# Patient Record
Sex: MALE | Race: ASIAN | NOT HISPANIC OR LATINO
[De-identification: names, ages, dates, MRNs, and addresses within clinical notes are randomized per-mention and may not be internally consistent; named-entity substitution may affect disease eponyms.]

---
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
## 2021-06-29 ENCOUNTER — APPOINTMENT (OUTPATIENT)
Dept: PEDIATRICS | Facility: CLINIC | Age: 21
End: 2021-06-29
Payer: MEDICAID

## 2021-06-29 VITALS
HEART RATE: 70 BPM | SYSTOLIC BLOOD PRESSURE: 120 MMHG | HEIGHT: 71 IN | BODY MASS INDEX: 21.67 KG/M2 | WEIGHT: 154.8 LBS | DIASTOLIC BLOOD PRESSURE: 72 MMHG

## 2021-06-29 DIAGNOSIS — Z78.9 OTHER SPECIFIED HEALTH STATUS: ICD-10-CM

## 2021-06-29 DIAGNOSIS — L21.0 SEBORRHEA CAPITIS: ICD-10-CM

## 2021-06-29 DIAGNOSIS — L85.3 XEROSIS CUTIS: ICD-10-CM

## 2021-06-29 PROCEDURE — 99173 VISUAL ACUITY SCREEN: CPT | Mod: 59

## 2021-06-29 PROCEDURE — 92551 PURE TONE HEARING TEST AIR: CPT

## 2021-06-29 PROCEDURE — 96160 PT-FOCUSED HLTH RISK ASSMT: CPT | Mod: 59

## 2021-06-29 PROCEDURE — 99395 PREV VISIT EST AGE 18-39: CPT | Mod: 25

## 2021-06-29 RX ORDER — UBIDECARENONE/VIT E ACET 100MG-5
1000 CAPSULE ORAL
Refills: 0 | Status: COMPLETED | COMMUNITY
End: 2021-06-29

## 2021-06-29 NOTE — HISTORY OF PRESENT ILLNESS
[Up to date] : Up to date [Eats meals with family] : eats meals with family [Has family members/adults to turn to for help] : has family members/adults to turn to for help [Is permitted and is able to make independent decisions] : Is permitted and is able to make independent decisions [Sleep Concerns] : no sleep concerns [Eats regular meals including adequate fruits and vegetables] : does not eat regular meals including adequate fruits and vegetables [Drinks non-sweetened liquids] : drinks non-sweetened liquids  [Calcium source] : calcium source [Has friends] : has friends [At least 1 hour of physical activity a day] : at least 1 hour of physical activity a day [Screen time (except homework) less than 2 hours a day] : screen time (except homework) less than 2 hours a day [Uses electronic nicotine delivery system] : does not use electronic nicotine delivery system [Uses tobacco] : does not use tobacco [Uses drugs] : does not use drugs  [Drinks alcohol] : does not drink alcohol [No] : Patient has not had sexual intercourse [Has ways to cope with stress] : has ways to cope with stress [Displays self-confidence] : displays self-confidence [Has problems with sleep] : does not have problems with sleep [Gets depressed, anxious, or irritable/has mood swings] : does not get depressed, anxious, or irritable/has mood swings [Has thought about hurting self or considered suicide] : has not thought about hurting self or considered suicide [With Teen] : teen [de-identified] : self [FreeTextEntry7] : 21 year well check.  Patient doing well.  No  concerns.  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  No current issues noted.\par - CRAFFT form reviewed.\par

## 2021-06-29 NOTE — DISCUSSION/SUMMARY
[Met privately with the adolescent for part of the office visit?] : Met privately with the adolescent for part of the office visit? Yes [Adolescent asks questions during each office  visit and participates in the care plan?] : Adolescent asks questions during each office visit and participates in the care plan? Yes [Initiated discussion about transfer to an adult healthcare provider.  Provided copy of transition letter?] : Initiated discussion about transfer to an adult healthcare provider.  Provided copy of transition letter? Yes [FreeTextEntry1] : - Follow up in 1 year for annual physical or sooner PRN.\par

## 2021-07-27 ENCOUNTER — APPOINTMENT (OUTPATIENT)
Dept: OTOLARYNGOLOGY | Facility: CLINIC | Age: 21
End: 2021-07-27
Payer: MEDICAID

## 2021-07-27 ENCOUNTER — NON-APPOINTMENT (OUTPATIENT)
Age: 21
End: 2021-07-27

## 2021-07-27 VITALS
HEIGHT: 71 IN | DIASTOLIC BLOOD PRESSURE: 65 MMHG | TEMPERATURE: 97.88 F | WEIGHT: 156 LBS | SYSTOLIC BLOOD PRESSURE: 103 MMHG | BODY MASS INDEX: 21.84 KG/M2 | HEART RATE: 54 BPM

## 2021-07-27 PROCEDURE — 31231 NASAL ENDOSCOPY DX: CPT

## 2021-07-27 PROCEDURE — 99204 OFFICE O/P NEW MOD 45 MIN: CPT | Mod: 25

## 2021-07-27 NOTE — CONSULT LETTER
[Please see my note below.] : Please see my note below. [FreeTextEntry1] : Dear Dr. LUPE BECK \par I had the pleasure of evaluating your patient ANGELINA MADSEN, thank you for allowing us to participate in their care. please see full note detailing our visit below.\par If you have any questions, please do not hesitate to call me and I would be happy to discuss further. \par \par Gray Gonzalez M.D.\par Attending Physician,  \par Department of Otolaryngology - Head and Neck Surgery\par [facility] \par Office: [phone]\par Fax: [phone]\par \par

## 2021-07-27 NOTE — END OF VISIT
[FreeTextEntry3] : I personally saw and examined ANGELINA MADSEN in detail. I spoke to CLAUS De regarding the assessment and plan of care.  I preformed the procedures and I reviewed the above assessment and plan of care, and agree. I have made changes in changes in the body of the note where appropriate.\par \par

## 2021-07-27 NOTE — HISTORY OF PRESENT ILLNESS
[de-identified] : 22 y/o M with hx of asthma when he was younger now c/o constant allergies, and constant b/l nasal congestion x whole life, states its all year around. \par Pt states mostly all day, but worse in the morning when he wakes up. \par pt not taking any nasal sprays, did try Claritin in the past with no relief \par tested for allergies when he was younger, long time ago. \par pt also with constant maxillary sinus pressure x 2 years  b/l, states worse in the morning and continues through the day. \par doesn’t really sinus infections, but has chronic sinus pressure everyday. \par never received abx or steroids.

## 2021-07-27 NOTE — PHYSICAL EXAM
[Midline] : trachea located in midline position [Normal] : no rashes [de-identified] : +nevin, globally to the left, and internally to the left, dorsally over projected, large tip, polybeak deformity

## 2021-07-27 NOTE — ASSESSMENT
[FreeTextEntry1] : pt with chronic nasal congestion, and sinus pressure with severe left NSD and BITH likely with allergies causing significant distress affecting sleeping and daytime breathing. has tried multiple Tx in the past has not helped, and is unlikely to help left side as it is total blocked anatomically.\par also on exam poor tip support, +nevin; nasal valve collapse, complete caudal severe left sided septum obstruction\par - ordered CT scan to access sinuses \par - We will proceed with a regiment of decongestants, antibiotics and irrigation to try to break the cycle of inflation and obstruction. this will treat the acute symptoms and will hopefully allow for maintenance therapy to reach disease areas and avoid further intervention.\par - Risks benefits and alternatives to septonasal reconstruction and bilateral inferior turbinate reduction discussed. risks of bleeding, infection, septal hematoma, injury to the skull base, septal perforation results in whistling, crusting and bleeding as well as continued nasal obstruction, cosmetic deformity and collapse were discussed. Patient understood risks and would like to continue with the operation.\par Offered option of cosmetic reconstruction. patient  did not want to pay extra for the cosmetic piece and his main concern is his breathing\par - pics taken today \par - allergy test next visit \par - simulation next visit \par can plan to do b/l  R>L as has a left global nasal deviation, with tip work with and dorsal reduction with osteotomies \par \par \par

## 2021-07-27 NOTE — PROCEDURE
[FreeTextEntry6] : Procedure performed: Nasal Endoscopy- Diagnostic\par Pre-op indication(s): nasal congestion\par Post-op indication(s): nasal congestion \par Verbal and/or written consent obtained from patient\par Anterior rhinoscopy insufficient to account for symptoms\par Scope #: 3,  flexible fiber optic telescope \par The scope was introduced in the nasal passage between the middle and inferior turbinates to exam the inferior portion of the middle meatus and the fontanelle, as well as the maxillary ostia.  Next, the scope was passed medically and posteriorly to the middle turbinates to examine the sphenoethmoid recess and the superior turbinate region.\par Upon visualization the finders are as follows:\par Nasal Septum: sigmoidal septal deviation, with severe nasal valve collapse \par Bilateral - Mucosa: boggy turbinates, Mucous: scant, Polyp: not seen, Inferior Turbinate: boggy, Middle Turbinate: b/l BITH, Superior Turbinate: normal, Inferior Meatus: narrow, Middle Meatus: narrow, Super Meatus:normal, Sphenoethmoidal Recess: clear\par

## 2021-07-30 ENCOUNTER — APPOINTMENT (OUTPATIENT)
Dept: OTOLARYNGOLOGY | Facility: CLINIC | Age: 21
End: 2021-07-30

## 2021-08-05 ENCOUNTER — OUTPATIENT (OUTPATIENT)
Dept: OUTPATIENT SERVICES | Facility: HOSPITAL | Age: 21
LOS: 1 days | End: 2021-08-05
Payer: MEDICAID

## 2021-08-05 ENCOUNTER — APPOINTMENT (OUTPATIENT)
Dept: CT IMAGING | Facility: CLINIC | Age: 21
End: 2021-08-05
Payer: MEDICAID

## 2021-08-05 DIAGNOSIS — J34.89 OTHER SPECIFIED DISORDERS OF NOSE AND NASAL SINUSES: ICD-10-CM

## 2021-08-05 PROCEDURE — 70486 CT MAXILLOFACIAL W/O DYE: CPT | Mod: 26

## 2021-08-05 PROCEDURE — 70486 CT MAXILLOFACIAL W/O DYE: CPT

## 2021-08-10 ENCOUNTER — NON-APPOINTMENT (OUTPATIENT)
Age: 21
End: 2021-08-10

## 2021-08-11 ENCOUNTER — NON-APPOINTMENT (OUTPATIENT)
Age: 21
End: 2021-08-11

## 2021-08-19 ENCOUNTER — APPOINTMENT (OUTPATIENT)
Dept: PEDIATRICS | Facility: CLINIC | Age: 21
End: 2021-08-19
Payer: MEDICAID

## 2021-08-19 VITALS — TEMPERATURE: 208.4 F

## 2021-08-19 PROCEDURE — 86580 TB INTRADERMAL TEST: CPT

## 2021-08-30 ENCOUNTER — APPOINTMENT (OUTPATIENT)
Dept: OTOLARYNGOLOGY | Facility: CLINIC | Age: 21
End: 2021-08-30
Payer: MEDICAID

## 2021-08-30 VITALS
HEIGHT: 71 IN | DIASTOLIC BLOOD PRESSURE: 59 MMHG | TEMPERATURE: 98 F | WEIGHT: 156 LBS | HEART RATE: 61 BPM | SYSTOLIC BLOOD PRESSURE: 99 MMHG | BODY MASS INDEX: 21.84 KG/M2

## 2021-08-30 PROCEDURE — 99214 OFFICE O/P EST MOD 30 MIN: CPT | Mod: 25

## 2021-08-30 PROCEDURE — 95004 PERQ TESTS W/ALRGNC XTRCS: CPT

## 2021-08-30 PROCEDURE — 31231 NASAL ENDOSCOPY DX: CPT

## 2021-08-30 NOTE — REASON FOR VISIT
[Subsequent Evaluation] : a subsequent evaluation for [FreeTextEntry2] : allergies and nasal congestion

## 2021-08-30 NOTE — CONSULT LETTER
[Please see my note below.] : Please see my note below. [FreeTextEntry1] : Dear Dr. LUPE BECK \par I had the pleasure of evaluating your patient ANGELINA MADSEN, thank you for allowing us to participate in their care. please see full note detailing our visit below.\par If you have any questions, please do not hesitate to call me and I would be happy to discuss further. \par \par Gray Gonzalez M.D.\par Attending Physician,  \par Department of Otolaryngology - Head and Neck Surgery\par [facility] \par Office: [phone]\par Fax: [phone]\par \par

## 2021-08-30 NOTE — PHYSICAL EXAM
[Midline] : trachea located in midline position [Normal] : no rashes [de-identified] : +nevin, globally to the left, and internally to the left, dorsally over projected, large tip, polybeak deformity

## 2021-08-30 NOTE — HISTORY OF PRESENT ILLNESS
[de-identified] : 22 y/o M with hx of asthma when he was younger now c/o constant allergies, and constant b/l nasal congestion x whole life, states its all year around. \par Pt states mostly all day, but worse in the morning when he wakes up. \par pt not taking any nasal sprays, did try Claritin in the past with no relief \par tested for allergies when he was younger, long time ago. \par pt also with constant maxillary sinus pressure x 2 years  b/l, states worse in the morning and continues through the day. \par doesn’t really sinus infections, but has chronic sinus pressure everyday. \par never received abx or steroids.

## 2021-08-30 NOTE — ASSESSMENT
[FreeTextEntry1] : pt with chronic nasal congestion, and sinus pressure with severe left NSD and BITH likely with allergies causing significant distress affecting sleeping and daytime breathing. has tried multiple Tx in the past has not helped, and is unlikely to help left side as it is total blocked anatomically.\par also on exam poor tip support, +nevin; nasal valve collapse, complete caudal severe left sided septum obstruction\par -CT reviewed - will do isolated maxillary sinus dilation \par - We will proceed with a regiment of decongestants, antibiotics and irrigation to try to break the cycle of inflation and obstruction. this will treat the acute symptoms and will hopefully allow for maintenance therapy to reach disease areas and avoid further intervention.\par Risks benefits and alternatives to septonasal reconstruction and bilateral inferior turbinate reduction discussed. Risks of bleeding, infection, septal hematoma, injury to the skull base, septal perforation results in whistling, permanent numbness in and around their nose, pain, discoloration or swelling that may persist, scarring crusting and bleeding as well as continued nasal obstruction, cosmetic deformity/ uneven-looking nose, collapse, scarring, synechiae, pollybeak deformity, rocker deformity, bone comminution, and need for revisionary surgery (patient explained that there is inherent revision rate to septorhinoplasty), osteitis, bleeding, infection, recurrent or persistent nasal deformity. Patient understood risks and would like to continue with the operation.\par - reviewed simulation\par - Allergy test performed. Counseled patient at length on pathophysiology all allergies and techniques for avoidance. handouts given.\par  will take down significant dorsal overprotection and see if can straiten out otherwise can consider asymmetric or just right . has left global nasal deviation, with tip work with and dorsal reduction with osteotomies. may need caudal septum resection and recon, looks like may have good dorsal support\par \par \par

## 2022-01-13 ENCOUNTER — OUTPATIENT (OUTPATIENT)
Dept: OUTPATIENT SERVICES | Facility: HOSPITAL | Age: 22
LOS: 1 days | End: 2022-01-13
Payer: MEDICAID

## 2022-01-13 VITALS
OXYGEN SATURATION: 98 % | DIASTOLIC BLOOD PRESSURE: 75 MMHG | HEART RATE: 72 BPM | WEIGHT: 147.05 LBS | RESPIRATION RATE: 18 BRPM | SYSTOLIC BLOOD PRESSURE: 127 MMHG | TEMPERATURE: 98 F | HEIGHT: 71 IN

## 2022-01-13 DIAGNOSIS — J34.3 HYPERTROPHY OF NASAL TURBINATES: ICD-10-CM

## 2022-01-13 DIAGNOSIS — Z01.818 ENCOUNTER FOR OTHER PREPROCEDURAL EXAMINATION: ICD-10-CM

## 2022-01-13 LAB
HCT VFR BLD CALC: 47.8 % — SIGNIFICANT CHANGE UP (ref 39–50)
HGB BLD-MCNC: 15.5 G/DL — SIGNIFICANT CHANGE UP (ref 13–17)
MCHC RBC-ENTMCNC: 28.5 PG — SIGNIFICANT CHANGE UP (ref 27–34)
MCHC RBC-ENTMCNC: 32.4 GM/DL — SIGNIFICANT CHANGE UP (ref 32–36)
MCV RBC AUTO: 87.9 FL — SIGNIFICANT CHANGE UP (ref 80–100)
NRBC # BLD: 0 /100 WBCS — SIGNIFICANT CHANGE UP (ref 0–0)
PLATELET # BLD AUTO: 213 K/UL — SIGNIFICANT CHANGE UP (ref 150–400)
RBC # BLD: 5.44 M/UL — SIGNIFICANT CHANGE UP (ref 4.2–5.8)
RBC # FLD: 12.4 % — SIGNIFICANT CHANGE UP (ref 10.3–14.5)
WBC # BLD: 5.59 K/UL — SIGNIFICANT CHANGE UP (ref 3.8–10.5)
WBC # FLD AUTO: 5.59 K/UL — SIGNIFICANT CHANGE UP (ref 3.8–10.5)

## 2022-01-13 PROCEDURE — G0463: CPT

## 2022-01-13 PROCEDURE — 85027 COMPLETE CBC AUTOMATED: CPT

## 2022-01-13 RX ORDER — LIDOCAINE HCL 20 MG/ML
0.2 VIAL (ML) INJECTION ONCE
Refills: 0 | Status: DISCONTINUED | OUTPATIENT
Start: 2022-01-19 | End: 2022-02-02

## 2022-01-13 RX ORDER — SODIUM CHLORIDE 9 MG/ML
3 INJECTION INTRAMUSCULAR; INTRAVENOUS; SUBCUTANEOUS EVERY 8 HOURS
Refills: 0 | Status: DISCONTINUED | OUTPATIENT
Start: 2022-01-19 | End: 2022-02-02

## 2022-01-13 NOTE — H&P PST ADULT - ATTENDING COMMENTS
Went over all risks, nose is very challenging, need to reduce projection, straiter strut to the left but also straiten to the right without widening the left side for narrowed valve of breathing or may push over nose more. also if take out caudal strut will not want to also destabilize the dorsal strut for straitening. Also with poor tip support. he understands and would like to prioritize breathing. will try to strike right balance.  also rereviewed post op instruction and risks and benefits of all parts of the procedure

## 2022-01-13 NOTE — H&P PST ADULT - HISTORY OF PRESENT ILLNESS
22 year old male with PMH of chronic nasal congestions presents today for presurgical testing. He states he has struggled with chronic nasal congestion throughout his life that is not relieved with OTC medications. He states it affects his daily life as well as his sleep. Patient now for septoplasty, b/l inferior turbinoplasty, nasal valve repair, maxillary sinus with medfusion.     Covid swab on 1/16 at [facility]    Gómez history of covid infection, covid symptoms, recent covid exposure, and recent travel.  22 year old male with PMH of chronic nasal congestions presents today for presurgical testing. He states he has struggled with chronic nasal congestion throughout his life that is not relieved with OTC medications. He states it affects his daily life as well as his sleep. Patient now for septoplasty, b/l inferior turbinoplasty, nasal valve repair, maxillary sinus with medfusion on 1/19/22     Covid swab on 1/16 at [facility]    Gómez history of covid infection, covid symptoms, recent covid exposure, and recent travel.

## 2022-01-13 NOTE — H&P PST ADULT - PROBLEM SELECTOR PLAN 1
septoplasty, bilateral inferior turbinoplasty, nasal valve repair, maxillary sinus with medfusion on 1/19/2022

## 2022-01-13 NOTE — H&P PST ADULT - FALL HARM RISK - UNIVERSAL INTERVENTIONS
Bed in lowest position, wheels locked, appropriate side rails in place/Call bell, personal items and telephone in reach/Instruct patient to call for assistance before getting out of bed or chair/Non-slip footwear when patient is out of bed/[place] to call system/Physically safe environment - no spills, clutter or unnecessary equipment/Purposeful Proactive Rounding/Room/bathroom lighting operational, light cord in reach

## 2022-01-16 ENCOUNTER — OUTPATIENT (OUTPATIENT)
Dept: OUTPATIENT SERVICES | Facility: HOSPITAL | Age: 22
LOS: 1 days | End: 2022-01-16
Payer: MEDICAID

## 2022-01-16 DIAGNOSIS — Z11.52 ENCOUNTER FOR SCREENING FOR COVID-19: ICD-10-CM

## 2022-01-16 LAB — SARS-COV-2 RNA SPEC QL NAA+PROBE: SIGNIFICANT CHANGE UP

## 2022-01-16 PROCEDURE — C9803: CPT

## 2022-01-16 PROCEDURE — U0005: CPT

## 2022-01-16 PROCEDURE — U0003: CPT

## 2022-01-18 ENCOUNTER — TRANSCRIPTION ENCOUNTER (OUTPATIENT)
Age: 22
End: 2022-01-18

## 2022-01-19 ENCOUNTER — APPOINTMENT (OUTPATIENT)
Dept: OTOLARYNGOLOGY | Facility: HOSPITAL | Age: 22
End: 2022-01-19

## 2022-01-19 ENCOUNTER — RESULT REVIEW (OUTPATIENT)
Age: 22
End: 2022-01-19

## 2022-01-19 ENCOUNTER — OUTPATIENT (OUTPATIENT)
Dept: OUTPATIENT SERVICES | Facility: HOSPITAL | Age: 22
LOS: 1 days | End: 2022-01-19
Payer: MEDICAID

## 2022-01-19 VITALS
SYSTOLIC BLOOD PRESSURE: 123 MMHG | TEMPERATURE: 98 F | DIASTOLIC BLOOD PRESSURE: 70 MMHG | OXYGEN SATURATION: 98 % | HEART RATE: 78 BPM | RESPIRATION RATE: 15 BRPM

## 2022-01-19 VITALS
TEMPERATURE: 98 F | WEIGHT: 147.05 LBS | OXYGEN SATURATION: 100 % | SYSTOLIC BLOOD PRESSURE: 98 MMHG | RESPIRATION RATE: 16 BRPM | HEART RATE: 61 BPM | HEIGHT: 71 IN | DIASTOLIC BLOOD PRESSURE: 61 MMHG

## 2022-01-19 DIAGNOSIS — J34.3 HYPERTROPHY OF NASAL TURBINATES: ICD-10-CM

## 2022-01-19 PROCEDURE — 30930 THER FX NASAL INF TURBINATE: CPT | Mod: 50

## 2022-01-19 PROCEDURE — 88300 SURGICAL PATH GROSS: CPT

## 2022-01-19 PROCEDURE — 30520 REPAIR OF NASAL SEPTUM: CPT | Mod: 58

## 2022-01-19 PROCEDURE — 61782 SCAN PROC CRANIAL EXTRA: CPT

## 2022-01-19 PROCEDURE — 88300 SURGICAL PATH GROSS: CPT | Mod: 26,59

## 2022-01-19 PROCEDURE — 30140 RESECT INFERIOR TURBINATE: CPT | Mod: 50,58

## 2022-01-19 PROCEDURE — 31267 ENDOSCOPY MAXILLARY SINUS: CPT | Mod: 50,58

## 2022-01-19 PROCEDURE — 88311 DECALCIFY TISSUE: CPT | Mod: 26

## 2022-01-19 PROCEDURE — 88305 TISSUE EXAM BY PATHOLOGIST: CPT | Mod: 26

## 2022-01-19 PROCEDURE — 88305 TISSUE EXAM BY PATHOLOGIST: CPT

## 2022-01-19 PROCEDURE — 20912 REMOVE CARTILAGE FOR GRAFT: CPT | Mod: 58,59

## 2022-01-19 PROCEDURE — 31267 ENDOSCOPY MAXILLARY SINUS: CPT | Mod: 50

## 2022-01-19 PROCEDURE — 30420 RECONSTRUCTION OF NOSE: CPT

## 2022-01-19 PROCEDURE — 88311 DECALCIFY TISSUE: CPT

## 2022-01-19 PROCEDURE — 30465 REPAIR NASAL STENOSIS: CPT | Mod: 58

## 2022-01-19 DEVICE — PACKING NASAL MEROGEL 4X4CM: Type: IMPLANTABLE DEVICE | Status: FUNCTIONAL

## 2022-01-19 RX ORDER — ONDANSETRON 8 MG/1
4 TABLET, FILM COATED ORAL ONCE
Refills: 0 | Status: DISCONTINUED | OUTPATIENT
Start: 2022-01-19 | End: 2022-02-02

## 2022-01-19 RX ORDER — OXYCODONE HYDROCHLORIDE 5 MG/1
5 TABLET ORAL ONCE
Refills: 0 | Status: DISCONTINUED | OUTPATIENT
Start: 2022-01-19 | End: 2022-01-19

## 2022-01-19 RX ORDER — SODIUM CHLORIDE 9 MG/ML
1000 INJECTION, SOLUTION INTRAVENOUS
Refills: 0 | Status: DISCONTINUED | OUTPATIENT
Start: 2022-01-19 | End: 2022-02-02

## 2022-01-19 NOTE — ASU PATIENT PROFILE, ADULT - FALL HARM RISK - UNIVERSAL INTERVENTIONS
Bed in lowest position, wheels locked, appropriate side rails in place/Call bell, personal items and telephone in reach/Instruct patient to call for assistance before getting out of bed or chair/Non-slip footwear when patient is out of bed/[place] to call system/Physically safe environment - no spills, clutter or unnecessary equipment/Purposeful Proactive Rounding/Room/bathroom lighting operational, light cord in reach

## 2022-01-19 NOTE — ASU DISCHARGE PLAN (ADULT/PEDIATRIC) - ASU DC SPECIAL INSTRUCTIONSFT
Nasal reconstruction instructions:  Complete antibiotics and steroids as directed on prescription  Pain medication as needed - do not drive, make decisions or operate machinery on pain meds. if constipates take stool softener, do not strain.     Avoid activities that may injure your nose: Right after your surgery, your nose is just starting to heal and can be damaged in many ways. Doing the following will help prevent your nose from getting injured:  ?Do not allow anyone to accidently bump your nose. This includes children, pets, and your bed partner.  ?Do not blow your nose for at least two weeks after surgery  ?Do not do any strenuous activities. Do not go swimming for one month after your surgery.  ?Do not wear clothing that you have to pull on over your head.  Bathing and washing:   ?Avoid touching or getting your nose dressing wet when washing your face.  ?Be gentle with brushing your teeth and use a soft toothbrush.  ?Take tub baths only and do not shower. Keep your dressing dry when bathing.      Wearing your contact lens or glasses:   ?You can wear your contact lenses  ?Do not wear your glasses or sunglasses where they rest on the top of your nose. A if needed you can use some tape to hang glasses off your forehead so the do not rest on your nose      •Nasal drainage: You may be sent home with a dressing under your nose. Change it when it gets wet and avoid touching your nose when doing so.  •Nasal rinsing:  Irrigate your nose cavity with saline (salt solution) 3 to 5 times each day. This should be done for at least 14 days after your surgery.   •Plaster cast and splint care: You may have a plaster cast or a splint on the outside of your nose. If so, it will be left on your nose for one week, will be removed in the office.  ?Do not touch or disturb the cast or splint  ?Keep the cast dry.    •Preventing swelling: Swelling of your nose and face is common right after your surgery. It can slow healing and increase your pain. The swelling may not go away for weeks or months after your surgery. Doing the following will help reduce your swelling:  ice pack under your eyes every 2 hours for the first 2 days after surgery if bones were broken and you have asplint.  ?Avoid bending over.  ?Rest and sleep with your head raised above your feet and body.  ?Avoid letting your face get too warm. This includes sitting in the sun, and using sun lamps and hair dryers.        CONTACT A CAREGIVER IF:  •You are sick to your stomach or throw up.  •You have a fever or chills.  •You have questions or concerns about your condition, medicine, or care.    SEEK CARE IMMEDIATELY IF:  •You have trouble breathing all of a sudden.  •Difficulty with vision  •Your bandage becomes soaked with blood and the bleeding does not stop.  •Your incision is swollen, red, or has pus coming from it.  •Your stitches come apart.

## 2022-01-19 NOTE — ASU DISCHARGE PLAN (ADULT/PEDIATRIC) - NS MD DC FALL RISK RISK
For information on Fall & Injury Prevention, visit: [email or web address] OR  [email or web address] OR  https://www.cdc.gov/steadi/patient.html

## 2022-01-19 NOTE — ASU DISCHARGE PLAN (ADULT/PEDIATRIC) - NURSING INSTRUCTIONS
Last dose of tylenol given at 0708  Next dose at/after 0108pm  Do not exceed 4000mg in a 24hr period.

## 2022-01-25 ENCOUNTER — NON-APPOINTMENT (OUTPATIENT)
Age: 22
End: 2022-01-25

## 2022-01-25 LAB — SURGICAL PATHOLOGY STUDY: SIGNIFICANT CHANGE UP

## 2022-01-28 ENCOUNTER — APPOINTMENT (OUTPATIENT)
Dept: OTOLARYNGOLOGY | Facility: CLINIC | Age: 22
End: 2022-01-28
Payer: MEDICAID

## 2022-01-28 VITALS — WEIGHT: 147 LBS | TEMPERATURE: 97.4 F | BODY MASS INDEX: 20.58 KG/M2 | HEIGHT: 71 IN

## 2022-01-28 PROBLEM — R09.81 NASAL CONGESTION: Chronic | Status: ACTIVE | Noted: 2022-01-13

## 2022-01-28 PROCEDURE — 99024 POSTOP FOLLOW-UP VISIT: CPT

## 2022-01-28 PROCEDURE — 31237 NSL/SINS NDSC SURG BX POLYPC: CPT | Mod: 50,58

## 2022-01-28 NOTE — PROCEDURE
[FreeTextEntry6] : procedure - bilateral endoscopic nasal  debridement\par Bilateral nasal cavities inspected with #0 ridged sinus endoscope. septum appeared midline and turbinates well reduced. bilateral middle meatuses were explored and suction and agitator were used to open ostiums and open any forming scar bands. all sinuses were explored and open at end of procedure\par splints removed

## 2022-01-28 NOTE — PHYSICAL EXAM
[Midline] : trachea located in midline position [Normal] : no rashes [de-identified] : splints removed

## 2022-01-28 NOTE — HISTORY OF PRESENT ILLNESS
[de-identified] : 1 week s/p septum, turbs, valve with graft, rhino, maxillary antrostomies\par doing well post op, no more bleeding \par + congestion\par no eye issues no clear leakage \par \par \par 20 y/o M with hx of asthma when he was younger now c/o constant allergies, and constant b/l nasal congestion x whole life, states its all year around. \par Pt states mostly all day, but worse in the morning when he wakes up. \par pt not taking any nasal sprays, did try Claritin in the past with no relief \par tested for allergies when he was younger, long time ago. \par pt also with constant maxillary sinus pressure x 2 years  b/l, states worse in the morning and continues through the day. \par doesn’t really sinus infections, but has chronic sinus pressure everyday. \par never received abx or steroids.

## 2022-01-28 NOTE — ASSESSMENT
[FreeTextEntry1] : pt with chronic nasal congestion, and sinus pressure with severe left NSD and BITH likely with allergies causing significant distress affecting sleeping and daytime breathing. 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

## 2022-01-28 NOTE — CONSULT LETTER
[Please see my note below.] : Please see my note below. [FreeTextEntry1] : Dear Dr. LUPE BECK \par I had the pleasure of evaluating your patient ANGELINA MADSEN, thank you for allowing us to participate in their care. please see full note detailing our visit below.\par If you have any questions, please do not hesitate to call me and I would be happy to discuss further. \par \par Gray Gonzalez M.D.\par Attending Physician,  \par Department of Otolaryngology - Head and Neck Surgery\par [facility] \par Office: [phone]\par Fax: [phone]\par \par

## 2022-04-05 ENCOUNTER — APPOINTMENT (OUTPATIENT)
Dept: OTOLARYNGOLOGY | Facility: CLINIC | Age: 22
End: 2022-04-05
Payer: MEDICAID

## 2022-04-05 VITALS
BODY MASS INDEX: 20.58 KG/M2 | TEMPERATURE: 98 F | HEART RATE: 69 BPM | DIASTOLIC BLOOD PRESSURE: 71 MMHG | HEIGHT: 71 IN | WEIGHT: 147 LBS | SYSTOLIC BLOOD PRESSURE: 122 MMHG

## 2022-04-05 PROCEDURE — 31237 NSL/SINS NDSC SURG BX POLYPC: CPT | Mod: 50,58

## 2022-04-05 PROCEDURE — 99024 POSTOP FOLLOW-UP VISIT: CPT

## 2022-04-05 NOTE — END OF VISIT
[FreeTextEntry3] : \par \par I personally saw and examined ANGELINA MADSEN in detail. I spoke to CLAUS De regarding the assessment and plan of care.  I preformed the procedures and I reviewed the above assessment and plan of care, and agree. I have made changes in changes in the body of the note where appropriate.\par \par

## 2022-04-05 NOTE — CONSULT LETTER
[Please see my note below.] : Please see my note below. [FreeTextEntry1] : Dear Dr. LUPE BECK \par I had the pleasure of evaluating your patient ANGELINA MADSEN, thank you for allowing us to participate in their care. please see full note detailing our visit below.\par If you have any questions, please do not hesitate to call me and I would be happy to discuss further. \par \par Gray Gonzalez M.D.\par Attending Physician,  \par Department of Otolaryngology - Head and Neck Surgery\par [facility] \par Office: [phone]\par Fax: [phone]\par \par

## 2022-04-05 NOTE — HISTORY OF PRESENT ILLNESS
[de-identified] : pt s/p septum, turbs, valve with graft, rhino, maxillary antrostomies 01/19/2022\par doing well post op, no more bleeding ,breathing well and much better\par doing saline washes, but not often\par + congestion\par very happy with appearance \par no eye issues no clear leakage \par \par \par 20 y/o M with hx of asthma when he was younger now c/o constant allergies, and constant b/l nasal congestion x whole life, states its all year around. \par Pt states mostly all day, but worse in the morning when he wakes up. \par pt not taking any nasal sprays, did try Claritin in the past with no relief \par tested for allergies when he was younger, long time ago. \par pt also with constant maxillary sinus pressure x 2 years  b/l, states worse in the morning and continues through the day. \par doesn’t really sinus infections, but has chronic sinus pressure everyday. \par never received abx or steroids.

## 2022-04-05 NOTE — ASSESSMENT
[FreeTextEntry1] : pt with chronic nasal congestion, and sinus pressure with severe left NSD and BITH likely with allergies causing significant distress affecting sleeping and daytime breathing. 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

## 2022-04-11 ENCOUNTER — APPOINTMENT (OUTPATIENT)
Dept: PEDIATRICS | Facility: CLINIC | Age: 22
End: 2022-04-11
Payer: MEDICAID

## 2022-04-11 VITALS — TEMPERATURE: 97.9 F | WEIGHT: 158 LBS

## 2022-04-11 PROCEDURE — 99213 OFFICE O/P EST LOW 20 MIN: CPT

## 2022-04-11 RX ORDER — AMOXICILLIN AND CLAVULANATE POTASSIUM 875; 125 MG/1; MG/1
875-125 TABLET, COATED ORAL
Qty: 20 | Refills: 0 | Status: DISCONTINUED | COMMUNITY
Start: 2022-01-12 | End: 2022-04-11

## 2022-04-11 RX ORDER — AMOXICILLIN AND CLAVULANATE POTASSIUM 875; 125 MG/1; MG/1
875-125 TABLET, COATED ORAL
Qty: 28 | Refills: 0 | Status: DISCONTINUED | COMMUNITY
Start: 2021-07-27 | End: 2022-04-11

## 2022-04-11 NOTE — HISTORY OF PRESENT ILLNESS
[de-identified] : vomiting on Thursday several times [FreeTextEntry6] : now with diarrhea 5-6 times per day since yesterday\par stoma aches\par afebrile\par no ill contacts\par no sore throat\par Drinking fluids with good UOP

## 2022-08-29 ENCOUNTER — APPOINTMENT (OUTPATIENT)
Dept: PEDIATRICS | Facility: CLINIC | Age: 22
End: 2022-08-29

## 2022-08-29 VITALS
WEIGHT: 156 LBS | HEIGHT: 69.75 IN | BODY MASS INDEX: 22.59 KG/M2 | DIASTOLIC BLOOD PRESSURE: 72 MMHG | HEART RATE: 76 BPM | SYSTOLIC BLOOD PRESSURE: 100 MMHG

## 2022-08-29 DIAGNOSIS — J34.3 HYPERTROPHY OF NASAL TURBINATES: ICD-10-CM

## 2022-08-29 DIAGNOSIS — Z86.19 PERSONAL HISTORY OF OTHER INFECTIOUS AND PARASITIC DISEASES: ICD-10-CM

## 2022-08-29 DIAGNOSIS — Z87.898 PERSONAL HISTORY OF OTHER SPECIFIED CONDITIONS: ICD-10-CM

## 2022-08-29 DIAGNOSIS — J34.89 OTHER SPECIFIED DISORDERS OF NOSE AND NASAL SINUSES: ICD-10-CM

## 2022-08-29 DIAGNOSIS — R94.6 ABNORMAL RESULTS OF THYROID FUNCTION STUDIES: ICD-10-CM

## 2022-08-29 DIAGNOSIS — Z00.00 ENCOUNTER FOR GENERAL ADULT MEDICAL EXAMINATION W/OUT ABNORMAL FINDINGS: ICD-10-CM

## 2022-08-29 PROCEDURE — 86580 TB INTRADERMAL TEST: CPT

## 2022-08-29 PROCEDURE — 99173 VISUAL ACUITY SCREEN: CPT | Mod: 59

## 2022-08-29 PROCEDURE — 99395 PREV VISIT EST AGE 18-39: CPT | Mod: 25

## 2022-08-29 PROCEDURE — 96160 PT-FOCUSED HLTH RISK ASSMT: CPT

## 2022-08-29 PROCEDURE — 92551 PURE TONE HEARING TEST AIR: CPT

## 2022-08-29 RX ORDER — OXYCODONE AND ACETAMINOPHEN 5; 325 MG/1; MG/1
5-325 TABLET ORAL
Qty: 18 | Refills: 0 | Status: DISCONTINUED | COMMUNITY
Start: 2022-01-12 | End: 2022-08-29

## 2022-08-29 RX ORDER — METHYLPREDNISOLONE 4 MG/1
4 TABLET ORAL
Qty: 1 | Refills: 0 | Status: DISCONTINUED | COMMUNITY
Start: 2021-07-27 | End: 2022-08-29

## 2022-08-29 RX ORDER — FLUTICASONE PROPIONATE 50 UG/1
50 SPRAY, METERED NASAL DAILY
Qty: 3 | Refills: 3 | Status: DISCONTINUED | COMMUNITY
Start: 2021-07-27 | End: 2022-08-29

## 2022-08-29 RX ORDER — OXYMETAZOLINE HCL 0.05 %
0.05 SPRAY, NON-AEROSOL (ML) NASAL
Qty: 1 | Refills: 0 | Status: DISCONTINUED | COMMUNITY
Start: 2021-07-27 | End: 2022-08-29

## 2022-08-29 RX ORDER — PREDNISONE 10 MG/1
10 TABLET ORAL
Qty: 27 | Refills: 0 | Status: DISCONTINUED | COMMUNITY
Start: 2022-01-12 | End: 2022-08-29

## 2022-08-29 NOTE — HISTORY OF PRESENT ILLNESS
[Up to date] : Up to date [Eats meals with family] : eats meals with family [Has family members/adults to turn to for help] : has family members/adults to turn to for help [Is permitted and is able to make independent decisions] : Is permitted and is able to make independent decisions [Grade: ____] : Grade: [unfilled] [Normal Performance] : normal performance [Drinks non-sweetened liquids] : drinks non-sweetened liquids  [Calcium source] : calcium source [Has friends] : has friends [At least 1 hour of physical activity a day] : at least 1 hour of physical activity a day [Screen time (except homework) less than 2 hours a day] : screen time (except homework) less than 2 hours a day [No] : Patient has not had sexual intercourse [Has ways to cope with stress] : has ways to cope with stress [Displays self-confidence] : displays self-confidence [With Teen] : teen [Sleep Concerns] : no sleep concerns [Eats regular meals including adequate fruits and vegetables] : does not eat regular meals including adequate fruits and vegetables [Uses electronic nicotine delivery system] : does not use electronic nicotine delivery system [Uses tobacco] : does not use tobacco [Uses drugs] : does not use drugs  [Drinks alcohol] : does not drink alcohol [Has problems with sleep] : does not have problems with sleep [Gets depressed, anxious, or irritable/has mood swings] : does not get depressed, anxious, or irritable/has mood swings [Has thought about hurting self or considered suicide] : has not thought about hurting self or considered suicide [de-identified] : self [FreeTextEntry7] : 22 year well visit. 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

## 2022-08-29 NOTE — PHYSICAL EXAM

## 2022-08-29 NOTE — DISCUSSION/SUMMARY
[Met privately with the adolescent for part of the office visit?] : Met privately with the adolescent for part of the office visit? Yes [Adolescent demonstrates understanding of his/her conditions and how to take prescribed medications?] : Adolescent demonstrates understanding of his/her conditions and how to take prescribed medications? Yes [Adolescent asks questions during each office  visit and participates in the care plan?] : Adolescent asks questions during each office visit and participates in the care plan? Yes [Adolescent is competent in independently making appointments, filling prescriptions, following up on referrals, and seeking emergency services, as needed?] : Adolescent is competent in independently making appointments, filling prescriptions, following up on referrals, and seeking emergency services, as needed? Yes [Adolescent's caregivers were provided with the opportunity to discuss their concerns about transferring decision making responsibility to the adolescent?] : Adolescent's caregivers were provided with the opportunity to discuss their concerns about transferring decision making responsibility to the adolescent? No [Discussed using Follow My Health to access health records and communicate with the adolescent's care team?] : Discussed using Follow My Health to access health records and communicate with the adolescent's care team? No [Initiated discussion about transfer to an adult healthcare provider?] : Initiated discussion about transfer to an adult healthcare provider? Yes  [Discussed choices for adult care and assist in identifying possible care providers?] : Discussed choices for adult care and assist in identifying possible care providers? Yes [Initiated communication with the adult provider that the family and adolescent has selected?] : Initiated communication with the adult provider that the family and adolescent has selected? No [Provided copy of  transition letter?] : Provided copy of transition letter? No [Transferred health records?] : Transferred health records? No [FreeTextEntry1] : Continue balanced diet with all food groups. Brush teeth twice a day with toothbrush. Recommend visit to dentist. Maintain consistent daily routines and sleep schedule. Personal hygiene, puberty, and sexual health reviewed. Risky behaviors assessed. School discussed. Limit screen time to no more than 2 hours per day. Encourage physical activity.\par Return 1 year for routine well child check.\par Cardiac questionnaire reviewed, NO issues.\par 5-2-1-0 questionnaire reviewed, parent(s) have no issues or concerns.\par Discussed in the preferred language of English\par Flu shot deferred\par Labs ordered - repeat TFTs as haven't been done in a few years and had been abnormal and had + thyroid antibodies

## 2022-08-29 NOTE — RISK ASSESSMENT

## 2022-09-07 ENCOUNTER — APPOINTMENT (OUTPATIENT)
Dept: PEDIATRICS | Facility: CLINIC | Age: 22
End: 2022-09-07

## 2022-09-07 PROCEDURE — 86580 TB INTRADERMAL TEST: CPT

## 2022-09-08 ENCOUNTER — APPOINTMENT (OUTPATIENT)
Dept: OTOLARYNGOLOGY | Facility: CLINIC | Age: 22
End: 2022-09-08

## 2022-09-08 ENCOUNTER — NON-APPOINTMENT (OUTPATIENT)
Age: 22
End: 2022-09-08

## 2022-09-08 VITALS
BODY MASS INDEX: 21.7 KG/M2 | DIASTOLIC BLOOD PRESSURE: 71 MMHG | HEIGHT: 71 IN | WEIGHT: 155 LBS | TEMPERATURE: 98.2 F | HEART RATE: 77 BPM | SYSTOLIC BLOOD PRESSURE: 126 MMHG

## 2022-09-08 DIAGNOSIS — J34.2 DEVIATED NASAL SEPTUM: ICD-10-CM

## 2022-09-08 DIAGNOSIS — J30.2 OTHER SEASONAL ALLERGIC RHINITIS: ICD-10-CM

## 2022-09-08 PROCEDURE — 99213 OFFICE O/P EST LOW 20 MIN: CPT | Mod: 25

## 2022-09-08 PROCEDURE — 31231 NASAL ENDOSCOPY DX: CPT

## 2022-09-12 PROBLEM — J34.2 DEVIATED NASAL SEPTUM: Status: ACTIVE | Noted: 2021-07-27

## 2022-09-12 NOTE — ASSESSMENT
[FreeTextEntry1] : Mr. MADSEN is a 22 year male s/p septum, turbs, valve with graft, rhino, maxillary antrostomies 01/19/2022\par had to completely reconstruct L-strut, replaced caudal strut, b/l spreaders, tip work, dorsal reduction, moralized cartilage on the right doing well today. He is very happy with both appearance and function of his nose since surgery. On scope he is wide open both sides no crusting and has healed well\par \par - pictures at next appt as camera is unavailable today\par - f/u in January pictures to be taken at that time\par \par \par

## 2022-09-12 NOTE — END OF VISIT
[FreeTextEntry3] : I personally saw and examined ANGELINA MADSEN in detail.  I spoke to FARZANA Fong regarding the assessment and plan of care. I performed the procedures and relevant physical exam.  I have reviewed the above assessment and plan of care and I agree.  I have made changes to the body of the note wherever necessary and appropriate.\par

## 2022-09-12 NOTE — HISTORY OF PRESENT ILLNESS
[de-identified] : pt s/p septum, turbs, valve with graft, rhino, maxillary antrostomies 01/19/2022\par doing well post op, no more bleeding ,breathing well and much better\par \par \par \par \par 20 y/o M with hx of asthma when he was younger now c/o constant allergies, and constant b/l nasal congestion x whole life, states its all year around. \par Pt states mostly all day, but worse in the morning when he wakes up. \par pt not taking any nasal sprays, did try Claritin in the past with no relief \par tested for allergies when he was younger, long time ago. \par pt also with constant maxillary sinus pressure x 2 years  b/l, states worse in the morning and continues through the day. \par doesn’t really sinus infections, but has chronic sinus pressure everyday. \par never received abx or steroids. [FreeTextEntry1] : pt doing well today, denies nasal congestion or sinus pressure and is happy with appearance of nose, takes PO allergy medication daily which works well for him

## 2022-09-12 NOTE — CONSULT LETTER
[Please see my note below.] : Please see my note below. [FreeTextEntry1] : Dear Dr. LUPE BECK \par I had the pleasure of evaluating your patient ANGELINA MADSEN, thank you for allowing us to participate in their care. please see full note detailing our visit below.\par If you have any questions, please do not hesitate to call me and I would be happy to discuss further. \par \par Gray Gonzalez M.D.\par Attending Physician,  \par Department of Otolaryngology - Head and Neck Surgery\par [facility]placed by [facility] \par Office: [phone]\par Fax: [phone]\par \par

## 2022-09-23 ENCOUNTER — NON-APPOINTMENT (OUTPATIENT)
Age: 22
End: 2022-09-23

## 2022-09-23 LAB
BASOPHILS # BLD AUTO: 0.05 K/UL
BASOPHILS NFR BLD AUTO: 1 %
CHOLEST SERPL-MCNC: 160 MG/DL
EOSINOPHIL # BLD AUTO: 0.14 K/UL
EOSINOPHIL NFR BLD AUTO: 2.8 %
HCT VFR BLD CALC: 49.3 %
HDLC SERPL-MCNC: 46 MG/DL
HGB BLD-MCNC: 15.6 G/DL
IMM GRANULOCYTES NFR BLD AUTO: 0.2 %
LDLC SERPL CALC-MCNC: 101 MG/DL
LYMPHOCYTES # BLD AUTO: 1.74 K/UL
LYMPHOCYTES NFR BLD AUTO: 34.9 %
MAN DIFF?: NORMAL
MCHC RBC-ENTMCNC: 28.4 PG
MCHC RBC-ENTMCNC: 31.6 GM/DL
MCV RBC AUTO: 89.8 FL
MONOCYTES # BLD AUTO: 0.38 K/UL
MONOCYTES NFR BLD AUTO: 7.6 %
NEUTROPHILS # BLD AUTO: 2.67 K/UL
NEUTROPHILS NFR BLD AUTO: 53.5 %
NONHDLC SERPL-MCNC: 113 MG/DL
PLATELET # BLD AUTO: 192 K/UL
RBC # BLD: 5.49 M/UL
RBC # FLD: 11.8 %
T4 FREE SERPL-MCNC: 1.4 NG/DL
T4 SERPL-MCNC: 7.9 UG/DL
THYROGLOB AB SERPL-ACNC: <20 IU/ML
THYROPEROXIDASE AB SERPL IA-ACNC: 13.5 IU/ML
TRIGL SERPL-MCNC: 64 MG/DL
TSH SERPL-ACNC: 1.8 UIU/ML
WBC # FLD AUTO: 4.99 K/UL
